# Patient Record
(demographics unavailable — no encounter records)

---

## 2024-10-30 NOTE — HISTORY OF PRESENT ILLNESS
[5] : 5 [4] : 4 [Dull/Aching] : dull/aching [de-identified] : 10/30/24: 10 yo male with right knee pain since last week. He states he felt pain after getting tackled during soccer. He was able to return to play but with pain. He rested for a week but pain persists. He states there is pain with running. No prior injuries.  [] : no [FreeTextEntry1] : right knee [FreeTextEntry5] : pain when running in last week

## 2024-10-30 NOTE — IMAGING
[Right] : right knee [There are no fractures, subluxations or dislocations. No significant abnormalities are seen] : There are no fractures, subluxations or dislocations. No significant abnormalities are seen [FreeTextEntry9] : open growth plates

## 2024-10-30 NOTE — ASSESSMENT
[FreeTextEntry1] : Discussed treatment options with patient and his mother. Recommend starting PT.  Let pain guide activities. Continue ice and NSAIDs as needed. If he continues to have pain in 3-4 weeks will follow up.   Entered by Charissa WILDER acting as scribe for Dr. Hernandez. The documentation recorded by the scribe accurately reflects the service I personally performed and the decisions made by me.

## 2024-10-30 NOTE — PHYSICAL EXAM
[NL (140)] : flexion 140 degrees [NL (0)] : extension 0 degrees [Right] : right knee [] : negative Valgus instability

## 2025-05-15 NOTE — IMAGING
[de-identified] : Ankle Exam:  Inspection: No swelling Palpation: tenderness over achilles Range of Motion: Dorsiflexion 20, plantar flexion 40, inversion 30, eversion 20 Strength: 5/5 plantar flexion, dorsiflexion, eversion, inversion Special testing: Negative drawer, mild pain with single heel raise, Negative tess Motor and sensory intact distally Vascular: +2 DP and PT pulses Gait: Non-antalgic gait

## 2025-05-15 NOTE — HISTORY OF PRESENT ILLNESS
[de-identified] : Koffi Delcid 12 y/o male complaining of LT ankle pain. Pt states sharp pain when running for a couple of days. No trauma or injury. Pt denies N/T. Pain scale with activity 7/10. Pain scale with rest 1/10.

## 2025-05-15 NOTE — ASSESSMENT
[FreeTextEntry1] : 10yo male presents with achilles tendinitis of left ankle   History of severs X-rays unneccessary at this time  Exam consistent with achilles tendinitis  Will attend PT for stretching  Proper warmup and footwear discussed Can advance activities as tolerated  RTC prn   I am working today under the supervision of Dr. Hernandez and I am following the plan of care of Dr. Hernandez as described by him on this date. Progress note completed by Estefania Hernandez PA-C under the supervision of Dr. Hernandez.